# Patient Record
Sex: MALE | Race: OTHER | NOT HISPANIC OR LATINO | Employment: UNEMPLOYED | URBAN - METROPOLITAN AREA
[De-identification: names, ages, dates, MRNs, and addresses within clinical notes are randomized per-mention and may not be internally consistent; named-entity substitution may affect disease eponyms.]

---

## 2023-03-10 ENCOUNTER — TELEPHONE (OUTPATIENT)
Dept: PRIMARY CARE | Facility: CLINIC | Age: 63
End: 2023-03-10
Payer: MEDICAID

## 2023-04-27 PROBLEM — G89.29 CHRONIC LOW BACK PAIN: Status: ACTIVE | Noted: 2023-04-27

## 2023-04-27 PROBLEM — R93.7 ABNORMAL CT SCAN, LUMBAR SPINE: Status: ACTIVE | Noted: 2023-04-27

## 2023-04-27 PROBLEM — L71.9 ROSACEA: Status: ACTIVE | Noted: 2023-04-27

## 2023-04-27 PROBLEM — M54.50 CHRONIC LOW BACK PAIN: Status: ACTIVE | Noted: 2023-04-27

## 2023-04-27 PROBLEM — E78.2 MIXED HYPERLIPIDEMIA: Status: ACTIVE | Noted: 2023-04-27

## 2023-04-27 PROBLEM — N40.0 HYPERTROPHY OF PROSTATE: Status: ACTIVE | Noted: 2023-04-27

## 2023-04-27 PROBLEM — R06.02 EXERTIONAL SHORTNESS OF BREATH: Status: ACTIVE | Noted: 2023-04-27

## 2023-04-27 PROBLEM — K21.9 GASTROESOPHAGEAL REFLUX DISEASE: Status: ACTIVE | Noted: 2023-04-27

## 2023-04-27 PROBLEM — R13.10 DYSPHAGIA: Status: ACTIVE | Noted: 2023-04-27

## 2023-04-27 PROBLEM — E55.9 VITAMIN D DEFICIENCY: Status: ACTIVE | Noted: 2023-04-27

## 2023-04-27 PROBLEM — R91.1 NODULE OF APEX OF RIGHT LUNG: Status: ACTIVE | Noted: 2023-04-27

## 2023-04-27 PROBLEM — M51.9 LUMBAR DISC DISEASE: Status: ACTIVE | Noted: 2023-04-27

## 2023-04-27 PROBLEM — R73.03 PREDIABETES: Status: ACTIVE | Noted: 2023-04-27

## 2023-04-27 PROBLEM — R05.3 PERSISTENT COUGH: Status: ACTIVE | Noted: 2023-04-27

## 2023-04-27 RX ORDER — KETOROLAC TROMETHAMINE 5 MG/ML
SOLUTION OPHTHALMIC 4 TIMES DAILY
COMMUNITY
Start: 2022-10-26

## 2023-04-27 RX ORDER — OFLOXACIN 3 MG/ML
1 SOLUTION/ DROPS OPHTHALMIC EVERY 4 HOURS
COMMUNITY
Start: 2022-10-26

## 2023-04-27 RX ORDER — PREDNISOLONE ACETATE 10 MG/ML
1 SUSPENSION/ DROPS OPHTHALMIC EVERY 4 HOURS
COMMUNITY
Start: 2022-10-26

## 2023-04-27 RX ORDER — SUCRALFATE 1 G/1
1 TABLET ORAL
COMMUNITY
Start: 2023-01-31 | End: 2023-05-09 | Stop reason: ALTCHOICE

## 2023-04-27 RX ORDER — METRONIDAZOLE 7.5 MG/G
CREAM TOPICAL 2 TIMES DAILY
COMMUNITY
Start: 2022-10-19

## 2023-04-27 RX ORDER — OMEPRAZOLE 20 MG/1
20 CAPSULE, DELAYED RELEASE ORAL
COMMUNITY

## 2023-05-09 ENCOUNTER — OFFICE VISIT (OUTPATIENT)
Dept: PRIMARY CARE | Facility: CLINIC | Age: 63
End: 2023-05-09
Payer: MEDICAID

## 2023-05-09 ENCOUNTER — LAB (OUTPATIENT)
Dept: LAB | Facility: LAB | Age: 63
End: 2023-05-09
Payer: MEDICAID

## 2023-05-09 VITALS
BODY MASS INDEX: 28.49 KG/M2 | TEMPERATURE: 97.3 F | HEIGHT: 65 IN | RESPIRATION RATE: 16 BRPM | HEART RATE: 66 BPM | SYSTOLIC BLOOD PRESSURE: 138 MMHG | WEIGHT: 171 LBS | OXYGEN SATURATION: 100 % | DIASTOLIC BLOOD PRESSURE: 83 MMHG

## 2023-05-09 DIAGNOSIS — Z00.00 ROUTINE GENERAL MEDICAL EXAMINATION AT A HEALTH CARE FACILITY: Primary | ICD-10-CM

## 2023-05-09 DIAGNOSIS — Z12.5 SCREENING FOR PROSTATE CANCER: ICD-10-CM

## 2023-05-09 DIAGNOSIS — Z12.11 SCREENING FOR COLON CANCER: ICD-10-CM

## 2023-05-09 DIAGNOSIS — E78.2 MIXED HYPERLIPIDEMIA: ICD-10-CM

## 2023-05-09 DIAGNOSIS — Z00.00 ROUTINE GENERAL MEDICAL EXAMINATION AT A HEALTH CARE FACILITY: ICD-10-CM

## 2023-05-09 LAB
ALANINE AMINOTRANSFERASE (SGPT) (U/L) IN SER/PLAS: 22 U/L (ref 10–52)
ALBUMIN (G/DL) IN SER/PLAS: 4.1 G/DL (ref 3.4–5)
ALKALINE PHOSPHATASE (U/L) IN SER/PLAS: 114 U/L (ref 33–136)
ANION GAP IN SER/PLAS: 11 MMOL/L (ref 10–20)
ASPARTATE AMINOTRANSFERASE (SGOT) (U/L) IN SER/PLAS: 20 U/L (ref 9–39)
BILIRUBIN TOTAL (MG/DL) IN SER/PLAS: 0.3 MG/DL (ref 0–1.2)
CALCIUM (MG/DL) IN SER/PLAS: 9.1 MG/DL (ref 8.6–10.3)
CARBON DIOXIDE, TOTAL (MMOL/L) IN SER/PLAS: 28 MMOL/L (ref 21–32)
CHLORIDE (MMOL/L) IN SER/PLAS: 102 MMOL/L (ref 98–107)
CHOLESTEROL (MG/DL) IN SER/PLAS: 215 MG/DL (ref 0–199)
CHOLESTEROL IN HDL (MG/DL) IN SER/PLAS: 59.6 MG/DL
CHOLESTEROL/HDL RATIO: 3.6
CREATININE (MG/DL) IN SER/PLAS: 0.79 MG/DL (ref 0.5–1.3)
ERYTHROCYTE DISTRIBUTION WIDTH (RATIO) BY AUTOMATED COUNT: 13.6 % (ref 11.5–14.5)
ERYTHROCYTE MEAN CORPUSCULAR HEMOGLOBIN CONCENTRATION (G/DL) BY AUTOMATED: 32.5 G/DL (ref 32–36)
ERYTHROCYTE MEAN CORPUSCULAR VOLUME (FL) BY AUTOMATED COUNT: 91 FL (ref 80–100)
ERYTHROCYTES (10*6/UL) IN BLOOD BY AUTOMATED COUNT: 4.61 X10E12/L (ref 4.5–5.9)
GFR MALE: >90 ML/MIN/1.73M2
GLUCOSE (MG/DL) IN SER/PLAS: 100 MG/DL (ref 74–99)
HEMATOCRIT (%) IN BLOOD BY AUTOMATED COUNT: 41.8 % (ref 41–52)
HEMOGLOBIN (G/DL) IN BLOOD: 13.6 G/DL (ref 13.5–17.5)
LDL: 144 MG/DL (ref 0–99)
LEUKOCYTES (10*3/UL) IN BLOOD BY AUTOMATED COUNT: 7.1 X10E9/L (ref 4.4–11.3)
PLATELETS (10*3/UL) IN BLOOD AUTOMATED COUNT: 289 X10E9/L (ref 150–450)
POTASSIUM (MMOL/L) IN SER/PLAS: 4.2 MMOL/L (ref 3.5–5.3)
PROSTATE SPECIFIC ANTIGEN,SCREEN: 3.84 NG/ML (ref 0–4)
PROTEIN TOTAL: 7.1 G/DL (ref 6.4–8.2)
SODIUM (MMOL/L) IN SER/PLAS: 137 MMOL/L (ref 136–145)
TRIGLYCERIDE (MG/DL) IN SER/PLAS: 58 MG/DL (ref 0–149)
UREA NITROGEN (MG/DL) IN SER/PLAS: 18 MG/DL (ref 6–23)
VLDL: 12 MG/DL (ref 0–40)

## 2023-05-09 PROCEDURE — 85027 COMPLETE CBC AUTOMATED: CPT

## 2023-05-09 PROCEDURE — 80061 LIPID PANEL: CPT

## 2023-05-09 PROCEDURE — 84153 ASSAY OF PSA TOTAL: CPT

## 2023-05-09 PROCEDURE — 80053 COMPREHEN METABOLIC PANEL: CPT

## 2023-05-09 PROCEDURE — 1036F TOBACCO NON-USER: CPT | Performed by: STUDENT IN AN ORGANIZED HEALTH CARE EDUCATION/TRAINING PROGRAM

## 2023-05-09 PROCEDURE — 99396 PREV VISIT EST AGE 40-64: CPT | Performed by: STUDENT IN AN ORGANIZED HEALTH CARE EDUCATION/TRAINING PROGRAM

## 2023-05-09 PROCEDURE — 36415 COLL VENOUS BLD VENIPUNCTURE: CPT

## 2023-05-09 ASSESSMENT — ENCOUNTER SYMPTOMS
CONSTIPATION: 0
SHORTNESS OF BREATH: 0
DIZZINESS: 0
VOMITING: 0
UNEXPECTED WEIGHT CHANGE: 0
WHEEZING: 0
COLOR CHANGE: 0
HEADACHES: 0
ABDOMINAL PAIN: 0
DIARRHEA: 0
FEVER: 0
CHILLS: 0
COUGH: 0
FATIGUE: 0
MUSCULOSKELETAL NEGATIVE: 1
PALPITATIONS: 0
NAUSEA: 0
CONFUSION: 0

## 2023-05-09 ASSESSMENT — PATIENT HEALTH QUESTIONNAIRE - PHQ9
2. FEELING DOWN, DEPRESSED OR HOPELESS: NOT AT ALL
1. LITTLE INTEREST OR PLEASURE IN DOING THINGS: NOT AT ALL
SUM OF ALL RESPONSES TO PHQ9 QUESTIONS 1 & 2: 0

## 2023-05-09 ASSESSMENT — LIFESTYLE VARIABLES
HOW OFTEN DO YOU HAVE A DRINK CONTAINING ALCOHOL: NEVER
AUDIT-C TOTAL SCORE: 0
HOW OFTEN DO YOU HAVE SIX OR MORE DRINKS ON ONE OCCASION: NEVER
HOW MANY STANDARD DRINKS CONTAINING ALCOHOL DO YOU HAVE ON A TYPICAL DAY: PATIENT DOES NOT DRINK
SKIP TO QUESTIONS 9-10: 1

## 2023-05-09 NOTE — PROGRESS NOTES
"Subjective   Patient ID: Rosa Thomas is a 62 y.o. male who presents for Annual Exam.  Reports he is doing better, no more dyspnea/cough; has R eye infection, following with ophtho monthly.     Self health assessment: good   Concern: none   Occupation: works at store   Living With: family     Sleep: well   Exercise: walk around   Diet: 1-2 meals a day, follow mixed diet   Tobacco: No  Alcohol: Alcohol Use: No, patient does not drink alcohol.  Sexually active: Yes; no problem      Dentist: last mo   Eye doctor: seeing currently   Hearing issues: No    Family history of colon cancer: no  Last colonoscopy & result: Never had; okay to do now   History of abnormal lipids: yes - mild HLD per pt    Review of Systems   Constitutional:  Negative for chills, fatigue, fever and unexpected weight change.   HENT: Negative.     Respiratory:  Negative for cough, shortness of breath and wheezing.    Cardiovascular:  Negative for chest pain, palpitations and leg swelling.   Gastrointestinal:  Negative for abdominal pain, constipation, diarrhea, nausea and vomiting.   Musculoskeletal: Negative.    Skin:  Negative for color change and rash.   Neurological:  Negative for dizziness and headaches.   Psychiatric/Behavioral:  Negative for behavioral problems and confusion.         Objective    /83 (BP Location: Right arm, Patient Position: Sitting, BP Cuff Size: Adult)   Pulse 66   Temp 36.3 °C (97.3 °F)   Resp 16   Ht 1.651 m (5' 5\")   Wt 77.6 kg (171 lb)   SpO2 100%   BMI 28.46 kg/m²  Body mass index is 28.46 kg/m².    Physical Exam  Vitals and nursing note reviewed.   Constitutional:       Appearance: Normal appearance.   HENT:      Right Ear: Tympanic membrane normal.      Left Ear: Tympanic membrane normal.   Eyes:      Extraocular Movements: Extraocular movements intact.      Pupils: Pupils are equal, round, and reactive to light.   Cardiovascular:      Rate and Rhythm: Normal rate and regular rhythm.      Pulses: " Normal pulses.      Heart sounds: Normal heart sounds.   Pulmonary:      Effort: Pulmonary effort is normal. No respiratory distress.      Breath sounds: Normal breath sounds.   Abdominal:      General: Abdomen is flat. Bowel sounds are normal.      Palpations: Abdomen is soft.   Musculoskeletal:         General: Normal range of motion.   Neurological:      General: No focal deficit present.      Mental Status: He is alert and oriented to person, place, and time.   Psychiatric:         Mood and Affect: Mood normal.         Behavior: Behavior normal.        Assessment and Plan   He is here for annual physical. Overall doing okay, no major concerns today and is clinically & vitally stable. Plan as follows      #HM   Screening tests:  - Colonoscopy (age 45-75): ordered   - Lipid profile: ordered     Primary prevention:  - Flu shot: UTD   - COVID vaccines: UTD   - Tdap shot: rec to get at health dept   - Shingles shot (age >50): rec to get at health dept     Counseling:   - Diet, Weight gain: Advise heart healthy diet (low carbs, low fat; add more fruits/veges and whole grain food) and regular exercise 30mins daily x 5 days per week.  Also rec 10mins of aerobic exercise/jogging daily x 5 days/wk  - Rec Ca (600-1200mg) and Vit D (800-1000U) daily     Others:  - Depression screening: Neg, happy appearing male  - Bld work per EMR, will call for any abn result, pt was made aware   - cont taking all other meds as rx'd     Fouad was seen today for annual exam.  Diagnoses and all orders for this visit:  Routine general medical examination at a health care facility (Primary)  -     CBC; Future  -     Comprehensive Metabolic Panel; Future  Screening for colon cancer  -     Colonoscopy; Future  Mixed hyperlipidemia  -     Lipid Panel; Future  Screening for prostate cancer  -     Prostate Spec.Ag,Screen; Future     RTC 1 yr physical.   Sridhar Rosales MD   Family Medicine

## 2023-05-11 ENCOUNTER — TELEPHONE (OUTPATIENT)
Dept: PRIMARY CARE | Facility: CLINIC | Age: 63
End: 2023-05-11
Payer: MEDICAID

## 2023-05-11 DIAGNOSIS — E78.2 MIXED HYPERLIPIDEMIA: Primary | ICD-10-CM

## 2023-05-11 RX ORDER — ATORVASTATIN CALCIUM 20 MG/1
20 TABLET, FILM COATED ORAL DAILY
Qty: 30 TABLET | Refills: 5 | Status: SHIPPED | OUTPATIENT
Start: 2023-05-11 | End: 2023-11-07

## 2023-10-16 ENCOUNTER — TELEPHONE (OUTPATIENT)
Dept: PRIMARY CARE | Facility: CLINIC | Age: 63
End: 2023-10-16
Payer: MEDICAID

## 2023-10-16 NOTE — TELEPHONE ENCOUNTER
PATIENT NEEDS REFILL SENT IN ON THESE MEDS, DOESN'T LOOK LIKE THEY WERE SENT IN FROM THE LAST VISIT    OMEPRAZOLE  METRONIDOZOLE    PH[facility]Y: [facility]

## 2023-10-24 NOTE — TELEPHONE ENCOUNTER
PATIENT CALLED BACK STATING THAT THESE WERE PRESCRIBED WHEN HE WAS IN THE HOSPITAL, HE JUST NEEDS THESE REFILLED.    WE SCHEDULED A FOLLOW UP FOR JANUARY TO FOLLOW UP    [facility]: [facility]

## 2023-10-24 NOTE — TELEPHONE ENCOUNTER
LMOM FOR PATIENT TO CALL OFFICE IF HE HAS ANY FURTHER QUESTIONS ABOUT THE MEDS, OR IF HE WOULD LIKE TO MAKE AN APPT TO FOLLOW UP WITH DR. HUNTER SOONER THEN THE APPT THAT IS ALREADY SCHEDULED

## 2024-01-16 ENCOUNTER — APPOINTMENT (OUTPATIENT)
Dept: PRIMARY CARE | Facility: CLINIC | Age: 64
End: 2024-01-16
Payer: MEDICAID

## 2024-05-09 ENCOUNTER — APPOINTMENT (OUTPATIENT)
Dept: PRIMARY CARE | Facility: CLINIC | Age: 64
End: 2024-05-09
Payer: MEDICAID

## 2024-09-30 DIAGNOSIS — R94.39 ABNORMAL STRESS TEST: Primary | ICD-10-CM

## 2024-09-30 DIAGNOSIS — R94.31 ABNORMAL EKG: ICD-10-CM

## 2024-10-03 ENCOUNTER — TELEPHONE (OUTPATIENT)
Dept: CARDIOLOGY | Facility: HOSPITAL | Age: 64
End: 2024-10-03
Payer: MEDICAID

## 2024-10-03 PROBLEM — R94.39 ABNORMAL STRESS TEST: Status: ACTIVE | Noted: 2024-09-30

## 2024-10-03 PROBLEM — R94.31 ABNORMAL EKG: Status: ACTIVE | Noted: 2024-09-30

## 2024-10-03 NOTE — TELEPHONE ENCOUNTER
RN called pt to move heart cath up at 0730 arrival and 0830 procedure. Pt verbalized understanding.        Called cath instructions to patient including review of date and arrival time.  Verified no need for dye prep.  Labs reviewed.  Nothing to eat or drink after midnight except ASA with a sip of water the morning of the cath.  You will need a .  Bring your ID, insurance cards and either a perfect list of the medications you are swallowing or the medications in original bottles.  Wear comfortable clothing.  There is a possibility of staying over night for observation so pack a bag with necessities for that.  Patient correctly repeated instructions, verbalized understanding and is agreeable to the plan.         ----- Message from Ese FERREIRA sent at 10/3/2024 11:26 AM EDT -----  Regarding: AG cath scheduled for 10/14/24  Can you please call him with instructions for his cath scheduled with AG for 10/14/24  Arrive 930a  Procedure 1030a    Asking Cynthia to update his H&P and do a ECG before procedure.   Looks like he had recent labs done, let me know if we need to do more?

## 2024-10-14 ENCOUNTER — HOSPITAL ENCOUNTER (OUTPATIENT)
Facility: HOSPITAL | Age: 64
Setting detail: OUTPATIENT SURGERY
Discharge: HOME | End: 2024-10-14
Attending: INTERNAL MEDICINE | Admitting: INTERNAL MEDICINE
Payer: MEDICAID

## 2024-10-14 VITALS
DIASTOLIC BLOOD PRESSURE: 83 MMHG | BODY MASS INDEX: 27.32 KG/M2 | SYSTOLIC BLOOD PRESSURE: 148 MMHG | RESPIRATION RATE: 16 BRPM | OXYGEN SATURATION: 99 % | WEIGHT: 170 LBS | HEIGHT: 66 IN | HEART RATE: 77 BPM | TEMPERATURE: 97.9 F

## 2024-10-14 DIAGNOSIS — R94.31 ABNORMAL EKG: ICD-10-CM

## 2024-10-14 DIAGNOSIS — R07.89 OTHER CHEST PAIN: ICD-10-CM

## 2024-10-14 DIAGNOSIS — R94.39 ABNORMAL STRESS TEST: Primary | ICD-10-CM

## 2024-10-14 PROCEDURE — 7100000009 HC PHASE TWO TIME - INITIAL BASE CHARGE: Performed by: INTERNAL MEDICINE

## 2024-10-14 PROCEDURE — 93458 L HRT ARTERY/VENTRICLE ANGIO: CPT | Performed by: INTERNAL MEDICINE

## 2024-10-14 PROCEDURE — 7100000010 HC PHASE TWO TIME - EACH INCREMENTAL 1 MINUTE: Performed by: INTERNAL MEDICINE

## 2024-10-14 PROCEDURE — C1887 CATHETER, GUIDING: HCPCS | Performed by: INTERNAL MEDICINE

## 2024-10-14 PROCEDURE — 2550000001 HC RX 255 CONTRASTS: Performed by: INTERNAL MEDICINE

## 2024-10-14 PROCEDURE — C1894 INTRO/SHEATH, NON-LASER: HCPCS | Performed by: INTERNAL MEDICINE

## 2024-10-14 PROCEDURE — 99152 MOD SED SAME PHYS/QHP 5/>YRS: CPT | Performed by: INTERNAL MEDICINE

## 2024-10-14 PROCEDURE — 2720000007 HC OR 272 NO HCPCS: Performed by: INTERNAL MEDICINE

## 2024-10-14 PROCEDURE — C1769 GUIDE WIRE: HCPCS | Performed by: INTERNAL MEDICINE

## 2024-10-14 PROCEDURE — C1760 CLOSURE DEV, VASC: HCPCS | Performed by: INTERNAL MEDICINE

## 2024-10-14 PROCEDURE — 96373 THER/PROPH/DIAG INJ IA: CPT | Mod: 59 | Performed by: INTERNAL MEDICINE

## 2024-10-14 PROCEDURE — 2500000004 HC RX 250 GENERAL PHARMACY W/ HCPCS (ALT 636 FOR OP/ED): Performed by: INTERNAL MEDICINE

## 2024-10-14 PROCEDURE — 2500000005 HC RX 250 GENERAL PHARMACY W/O HCPCS: Performed by: INTERNAL MEDICINE

## 2024-10-14 RX ORDER — ACETAMINOPHEN 160 MG/5ML
650 SOLUTION ORAL EVERY 6 HOURS PRN
Status: CANCELLED | OUTPATIENT
Start: 2024-10-14

## 2024-10-14 RX ORDER — ACETAMINOPHEN 325 MG/1
650 TABLET ORAL EVERY 6 HOURS PRN
Status: CANCELLED | OUTPATIENT
Start: 2024-10-14

## 2024-10-14 RX ORDER — WATER
240 LIQUID (ML) MISCELLANEOUS
Status: CANCELLED | OUTPATIENT
Start: 2024-10-14 | End: 2024-10-14

## 2024-10-14 RX ORDER — MORPHINE SULFATE 2 MG/ML
2 INJECTION, SOLUTION INTRAMUSCULAR; INTRAVENOUS EVERY 6 HOURS PRN
Status: CANCELLED | OUTPATIENT
Start: 2024-10-14

## 2024-10-14 RX ORDER — ASPIRIN 81 MG/1
81 TABLET ORAL DAILY
COMMUNITY

## 2024-10-14 RX ORDER — HEPARIN SODIUM 1000 [USP'U]/ML
INJECTION, SOLUTION INTRAVENOUS; SUBCUTANEOUS AS NEEDED
Status: DISCONTINUED | OUTPATIENT
Start: 2024-10-14 | End: 2024-10-14 | Stop reason: HOSPADM

## 2024-10-14 RX ORDER — ACETAMINOPHEN 650 MG/1
650 SUPPOSITORY RECTAL EVERY 6 HOURS PRN
Status: CANCELLED | OUTPATIENT
Start: 2024-10-14

## 2024-10-14 RX ORDER — FENTANYL CITRATE 50 UG/ML
INJECTION, SOLUTION INTRAMUSCULAR; INTRAVENOUS AS NEEDED
Status: DISCONTINUED | OUTPATIENT
Start: 2024-10-14 | End: 2024-10-14 | Stop reason: HOSPADM

## 2024-10-14 RX ORDER — MIDAZOLAM HYDROCHLORIDE 1 MG/ML
INJECTION, SOLUTION INTRAMUSCULAR; INTRAVENOUS AS NEEDED
Status: DISCONTINUED | OUTPATIENT
Start: 2024-10-14 | End: 2024-10-14 | Stop reason: HOSPADM

## 2024-10-14 RX ORDER — LIDOCAINE HYDROCHLORIDE 20 MG/ML
INJECTION, SOLUTION INFILTRATION; PERINEURAL AS NEEDED
Status: DISCONTINUED | OUTPATIENT
Start: 2024-10-14 | End: 2024-10-14 | Stop reason: HOSPADM

## 2024-10-14 ASSESSMENT — ENCOUNTER SYMPTOMS
MUSCULOSKELETAL NEGATIVE: 1
CONSTITUTIONAL NEGATIVE: 1
ENDOCRINE NEGATIVE: 1
PSYCHIATRIC NEGATIVE: 1
NEUROLOGICAL NEGATIVE: 1
RESPIRATORY NEGATIVE: 1
CARDIOVASCULAR NEGATIVE: 1

## 2024-10-14 ASSESSMENT — PAIN SCALES - GENERAL

## 2024-10-14 ASSESSMENT — PAIN - FUNCTIONAL ASSESSMENT
PAIN_FUNCTIONAL_ASSESSMENT: 0-10

## 2024-10-14 NOTE — DISCHARGE INSTRUCTIONS
If you have any questions, please call the Cath Lab Nurse Practitioner Cynthiacorby Holbrook at [phone] and leave a message. She will return your call the same day if calling before 3 PM, M-F. If you call on the weekend you can expect a call back on Monday morning. You may also call the Cath Lab at [phone] M-F, 7-3:30 with any questions. Weekends and after hours please call your cardiologist office number to reach a physician on call. The heart group office number is [phone]           CARDIAC CATHETERIZATION DISCHARGE INSTRUCTIONS     FOR SUDDEN AND SEVERE CHEST PAIN, SHORTNESS OF BREATH, EXCESSIVE BLEEDING, SIGNS OF STROKE, OR CHANGES IN MENTAL STATUS YOU SHOULD CALL 911 IMMEDIATELY.     If your provider has prescribed aspirin and/or clopidogrel (Plavix), or prasugrel (Effient), or ticagrelor (Brilinta), DO NOT STOP THESE MEDICATIONS for any reason without talking to your cardiologist first. If any of these were prescribed, you must take them every day without missing a single dose. If you are getting low on these medications, contact your provider immediately for a refill.     FOR NEXT 24 HOURS  - Upon discharge, you should return home and rest for the remainder of the day and evening. You do not have to stay on bed rest but should not be very active.  It is recommended a responsible adult be with you for the first 24 hours after the procedure.    - No driving for 24 hours after procedure. Please arrange for someone to drive you home from the hospital today.     - Do not drive, operate machinery, or use power tools for 24 hours after your procedure.     - Do not make any legal decisions for 24 hours after your procedure.     - Do not drink alcoholic beverages for 24 hours after your procedure.    WOUND CARE   *FOR FEMORAL (LEG) ACCESS*  ·      Avoid heavy lifting (over 10 pounds) for 7 days, squatting or excessive bending for 2 days, and strenuous exercise for 7 days.  ·      No submerged bathing, swimming, or  hot tubs for the next 7 days, or until fully healed.  ·      Avoid sexual activity for 3-4 days until any groin discomfort has ceased.     *FOR RADIAL (WRIST) ACCESS*  ·      No lifting more than 5 pounds or excessive use of the wrist for 24 hours - for example, treat your wrist as if it is sprained.  ·      Do not engage in vigorous activities (tennis, golf, bowling, weights) for at least 48 hours after the procedure.  ·      Do not submerge the wrist for 7 days after the procedure.  ·      You should expect mild tingling in your hand and tenderness at the puncture site for up to 3 days.    - The transparent dressing should be removed from the site 24 hours after the procedure.  Wash the site gently with soap and water. Rinse well and pat dry. Keep the area clean and dry. You may apply a Band-Aid to the site. Avoid lotions, ointments, or powders until fully healed.     - You may shower the day after your procedure.      - It is normal to notice a small bruise around the puncture site and/or a small grape sized or smaller lump. Any large bruising or large lump warrants a call to the office.     - If bleeding should occur, lay down and apply pressure to the affected area for 10 minutes.  If the bleeding stops notify your physician.  If there is a large amount of bleeding or spurting of blood CALL 911 immediately.  DO NOT drive yourself to the hospital.    - You may experience some tenderness, bruising or minimal inflammation.  If you have any concerns, you may contact the Cath Lab or if any of these symptoms become excessive, contact your cardiologist or go to the emergency room.     OTHER INSTRUCTIONS  - You may take acetaminophen (Tylenol) as directed for discomfort.  If pain is not relieved with acetaminophen (Tylenol), contact your doctor.    - If you notice or experience any of the following, you should notify your doctor or seek medical attention  Chest pain or discomfort  Change in mental status or weakness in  extremities.  Dizziness, light headedness, or feeling faint.  Change in the site where the procedure was performed, such as bleeding or an increased area of bruising or swelling.  Tingling, numbness, pain, or coolness in the leg/arm beyond the site where the procedure was performed.  Signs of infection (i.e. shaking chills, temperature > 100 degrees Fahrenheit, warmth, redness) in the leg/arm area where the procedure was performed.  Changes in urination   Bloody or black stools  Vomiting blood  Severe nose bleeds  Any excessive bleeding    - If you DO NOT have an appointment with your cardiologist within 2-4 weeks following your procedure, please contact their office.      Important ways to reduce your risk of coronary artery disease by healthy diet, maintaining a healthy weight, exercising regularly and stop using tobacco products.     Mediterranean Diet    About this topic  This is a heart healthy diet. It is based on widely used foods and cooking styles from many countries around the [place]. The main pattern for the diet is more plant foods and monounsaturated fats, or good fats, like olive oil. Protein in this diet comes from seafood, legumes, nuts, seeds, and poultry and eggs in lowered amounts. You will also eat more whole grains, vegetables, and fruits and moderate amounts of alcohol are also included. This diet has less red meats, dairy products, and processed foods.    What will the results be?  Your diet will have less saturated fat, cholesterol, calories, sodium, and added sugars. Your diet will be higher in fiber. This will help to keep your blood sugar steady. This diet lowers the chance of heart disease and other health problems.  What lifestyle changes are needed?  If you do not often eat this way, you will need to change your eating habits. Be sure to get regular exercise. It is believed to help the health benefits of this diet.  What changes to diet are needed?  You may need to limit the  "amount of red meat and processed foods in your diet. Ask your dietitian for help planning meals that are right for you.  What foods are good to eat?  Plenty of fish and other seafood  Fresh, frozen, or canned fruits and vegetables  Nuts and nut butters and dried beans, lentils, or peas  Foods high in fiber like whole grains and whole grain products  Olive oil (good fat), peanut or canola oil, margarine, or spreads that list vegetable oil as the first ingredient and do not contain trans fat or partially hydrogenated oil  Small amounts of poultry and eggs  What foods should be limited or avoided?  Red meats  Sweets, desserts, and processed foods  Butter, oils, and fats that contain trans fats or are hydrogenated or partially hydrogenated  Gravies and sauces  What problems could happen?  Your weight may rise because your diet will be higher in fat from olive oil and nuts.  You may have lower iron levels. Be sure to eat foods rich in iron. Also, eat foods rich in vitamin C. This will help your body take in iron.  You may have lower calcium levels because you are eating less dairy products. Ask your doctor if you need to take a calcium supplement.  Wine is often thought of as part of a Mediterranean diet. It is not needed and you may choose not to include it. Avoid wine if you are prone to alcohol abuse or are pregnant. Also, avoid it if you are at risk for breast cancer, have liver problems, or have other illnesses that make it important for you to not have alcohol.  When do I need to call the doctor?  If you have any concerns about your diet     Health risks of obesity    The Basics  Written by the doctors and editors at [facility]  What does it mean to have obesity? -- Doctors use a calculation called \"body mass index,\" or \"BMI,\" to decide whether a person is underweight, at a healthy weight, overweight, or has obesity.  Your BMI will tell you which category you are in based on your weight and height (figure 1):  ?If " "your BMI is between 25 and 29.9, you are overweight.  ?If your BMI is 30 or greater, you have obesity.  Obesity is a problem, because it increases the risks of many different health problems. It can also make it hard for you to move, breathe, and do other things that people who are at a healthy weight can do easily.  What are the health risks of obesity? -- Having obesity increases a person's risk of developing many health problems. Here are just a few examples:  ?Diabetes  ?High blood pressure  ?High cholesterol  ?Heart disease (including heart attacks)  ?Stroke  ?Sleep apnea (a disorder in which you stop breathing for short periods while asleep)  ?Asthma  ?Cancer  Does having obesity shorten a person's life? -- Yes. Studies show that people with obesity die younger than people who are a healthy weight. They also show that the risk of death goes up the heavier a person is. The degree of increased risk depends on how long the person has had obesity, and on what other medical problems they have.  People with \"central obesity\" might also be at risk of dying younger. Central obesity means carrying extra weight in the belly area, even if the BMI is normal.  Should I see an expert? -- Yes. If you are overweight or have obesity, you can talk to your doctor or nurse. They might have suggestions for healthy ways to lose weight. It can also help to work with a dietitian (food and nutrition expert). A dietitian can help you choose healthy foods and plan meals.  Are there medical treatments that can help me lose weight? -- Yes. There are medicines and surgery to help with weight loss. But those treatments are only for people who have not been able to lose weight through lifestyle changes such as diet and exercise. Also, weight loss treatments do not take the place of diet and exercise. People who have those treatments must also change how they eat and how active they are.  What can I do to prevent the problems caused by " obesity? -- The best thing that you can do is lose weight. But even if weight loss is not possible, you can improve your health and lower your risk if you:  ?Become more active - Many types of physical activity can help, including walking. You can start with a few minutes a day and add more as you get stronger and build up your endurance. Anything that gets your body moving is good for you.  ?Improve your diet - It is healthy to have regular meal times, eat smaller portions, and not skip meals. Limit sweets, and avoid processed foods. Try to eat more vegetables and fruits instead.  ?Quit smoking (if you smoke) - Some people start eating more after they stop smoking, so try to make healthy food choices. Even if it increases your appetite, quitting smoking is still one of the best things that you can do to improve your health.  ?Limit alcohol - For females, drink no more than 1 drink a day. For males, drink no more than 2 drinks a day.  What causes obesity? -- The thing that increases a person's risk the most is having an unhealthy lifestyle. Most people develop obesity because they eat too much, eat unhealthy foods, and move too little. That's especially true of people who watch too much TV. But there are also other things that seem to increase the risk of obesity that many people do not know about. Here are some things that might affect a person's chance of developing obesity:  ?Mother's habits during and after pregnancy - People who eat a lot of calories, have diabetes, or smoke during pregnancy have a higher chance of having babies who have obesity as adults. Also, babies who drink formula might be more likely than  babies to develop obesity later in life.  ?Habits and weight gain during childhood - Children or teens who are overweight or have obesity are more likely to become have obesity as an adult.  ?Sleeping too little - People who do not get enough sleep are more likely to develop obesity than  "people who sleep enough.  ?Taking certain medicines - Long-term use of certain medicines, such as some medicines to treat depression, can cause weight gain. If you are concerned that 1 of your medicines might be making you gain weight, talk to your doctor or nurse. They might be able to switch you to a different medicine instead.  ?Certain hormonal conditions - Some hormonal problems can increase the risk of developing obesity. For example, a condition called \"polycystic ovary syndrome\" can cause weight gain, along with other symptoms like irregular periods.  What if I want to get pregnant? -- If you are overweight or have obesity, it might be harder to get pregnant. For males, obesity can also cause sex problems, like having trouble getting or keeping an erection. This is more likely if you also have high blood pressure or diabetes.  What if my child has obesity? -- In children, obesity has many of the same risks as it does in adults. For example, it can increase the risk of diabetes, high blood pressure, asthma, and sleep apnea. It can also cause added problems related to childhood. For example, obesity can make children grow faster than normal and cause girls to go through puberty earlier than usual.  All topics are updated as new evidence becomes available and our peer review process is complete.  This topic retrieved from [employer] on: Jan 11, 2024.  Topic 77104 Version 17.0  Release: 31.6.4 - C32.10  © 2024 [employer] and/or its affiliates. All rights reserved.  figure 1: Your body mass index (BMI)        If you use tobacco products please review   Quitting smoking    The Basics  Written by the doctors and editors at [employer]  What are the benefits of quitting smoking? -- Quitting smoking can dramatically improve your health and help you live longer. It lowers your risk of heart disease, lung disease, kidney failure, cancer, infection, stomach problems, and diabetes.  Quitting smoking can also lower your " "chances of getting osteoporosis, a condition that makes your bones weak.  Quitting is not easy for most people, and it might take several tries to completely quit. But help and support are available. Quitting smoking will improve your health no matter how old you are, even if you have smoked for a long time.  What should I do if I want to quit smoking? -- It's a good idea to start by talking with your doctor or nurse. It is possible to quit on your own, without help. But getting help greatly increases your chances of quitting successfully.  When you are ready to quit, you will make a plan to:  ?Set a quit date.  ?Tell your family and friends that you plan to quit.  ?Plan ahead for the challenges you will face, such as cigarette cravings.  ?Remove cigarettes from your home, car, and work.  How can my doctor or nurse help? -- Your doctor or nurse can give you advice on the best way to quit. They can also give you medicines to:  ?Reduce your craving for cigarettes  ?Reduce your \"withdrawal\" symptoms (symptoms that happen when you stop smoking)  Your doctor or nurse can also help you find a counselor to talk to. For most people who are trying to quit smoking, it works best to use both medicines and counseling.  You can also get help from a free phone line (8-218-QUITNOW, or 1-849.497.8467) or go online to www.smokefree.gov.  What are the symptoms of withdrawal? -- When you stop smoking, you might have symptoms such as:  ?Trouble sleeping  ?Feeling irritable, anxious, or restless  ?Getting frustrated or angry  ?Having trouble thinking clearly  These symptoms can be hard to deal with, which is why it can be so hard to quit. But medicines can help.  Some people who stop smoking become temporarily depressed. Some people need treatment for depression, such as counseling or medicines or both. People with depression might:  ?No longer enjoy or care about doing the things they used to like to do  ?Feel sad, down, hopeless, " nervous, or cranky most of the day, almost every day  ?Lose or gain weight  ?Sleep too much or too little  ?Feel tired or like they have no energy  ?Feel guilty or like they are worth nothing  ?Forget things or feel confused  ?Move and speak more slowly than usual  ?Act restless or have trouble staying still  ?Think about death or suicide  If you think you might be depressed, tell your doctor or nurse right away. They can talk to you about your symptoms and recommend treatment if needed.  Get help right away if you are thinking of hurting or killing yourself! -- Sometimes, people with depression think of hurting or killing themselves. If you ever feel like you might hurt yourself, help is available:  ?In the , contact the [facility] Suicide & Crisis Lifeline:  To speak to someone, call or text [facility].  To talk to someone online, go to www.[employer]/chat.  ?Call your doctor or nurse and tell them that it is an emergency.  ?Call for an ambulance (in the [place] and [place], call 9-1-1).  ?Go to the emergency department at your local hospital.  If you think your partner might have depression, or if you are worried that they might hurt themselves, get them help right away.  How does counseling work? -- A counselor can help you figure out:  ?What triggers you to want to smoke, and how to handle these situations  ?How to resist cravings  ?What you can do differently if you have tried to quit before  You can meet with a counselor in 1-on-1 sessions or as part of a group. There are other ways to get counseling, too, such as over the phone, through text messaging, or online.  How do medicines help you stop smoking? -- Different medicines work in different ways:  ?Nicotine replacement therapy - Nicotine is the main drug in cigarettes, and the reason they are addictive. These medicines reduce your body's craving for nicotine. They also help with withdrawal symptoms.  There are different forms of nicotine replacement, including skin  "patches, lozenges, gum, nasal sprays, and inhalers. Most can be bought without a prescription. Also, health insurance might cover some or all of the cost.  It often helps to use 2 forms of nicotine replacement. For example, you might wear a patch all the time, plus use gum or lozenges when you get a craving to smoke.  ?Varenicline - Varenicline (brand names: Chantix, Champix) is a prescription medicine that reduces withdrawal symptoms and cigarette cravings. Varenicline can increase the effects of alcohol in some people. It's a good idea to limit drinking while you're taking it, at least until you know how it affects you.  Even if you are not yet ready to commit to a quit date, varenicline can help reduce cravings. This can make it easier to quit when you are ready.  ?Bupropion - Bupropion (sample brand names: Wellbutrin, Zyban) is a prescription medicine that reduces your desire to smoke. It is also available in a generic version, which is cheaper than the brand name medicines. Doctors do not usually prescribe bupropion for people with seizures or who have had seizures in the past.  It might also be helpful to combine nicotine replacement with bupropion or varenicline. In some cases, a person might even take both bupropion and varenicline. Your doctor or nurse can help you figure out the best combination for you.  What about e-cigarettes? -- Sometimes people wonder if using electronic cigarettes, or \"e-cigarettes,\" can help them quit smoking. Using e-cigarettes is also called \"vaping.\" Doctors do not recommend e-cigarettes in place of using of medicines and counseling. That's because e-cigarettes still contain nicotine as well as other substances that might be harmful. It's also not clear how they can affect a person's health in the long term.  What if I am pregnant and I smoke? -- If you are pregnant, it's really important for the health of your baby that you quit. Ask your doctor what options you have, and what " is safest for your baby.  What if I have already smoked for a long time? -- The longer you have smoked, the higher your chances are of having health problems. But it is never too late to quit smoking. It helps your health even if you are older or have smoked for many years. The best thing you can do to lower your chance of having a health problem caused by smoking is to quit.  Will I gain weight if I quit? -- You might gain a few pounds. This can be frustrating for some people. But it's important to remember that you are improving your health by quitting smoking. You can help prevent gaining a lot of weight by staying active and eating a healthy diet.  What if I am not able to quit? -- If you don't quit on your first try, or if you quit but then start smoking again, don't give up hope. Lots of people have to try more than once before they are able to completely quit.  It might help to try to understand why quitting did not work. There might be something you can do differently when you try again. It can help to figure out which situations make you want to smoke, so you can avoid them.  What else can I do to improve my chances of quitting? -- You can:  ?Get regular exercise. Any type of physical activity, even gentle forms of movement, is good for your health. Physical activity can also help reduce stress.  ?Stay away from people who smoke and places that make you want to smoke. If people close to you smoke, ask them to quit with you or avoid smoking around you.  ?Carry gum, hard candy, or something to put in your mouth. If you get a craving for a cigarette, try 1 of these instead.  ?Don't give up, even if you start smoking again. It takes most people a few tries before they succeed.  All topics are updated as new evidence becomes available and our peer review process is complete.

## 2024-10-14 NOTE — H&P
"History Of Present Illness:    Rosa Thomas is a 63 y.o. male presenting with NSVT during stress test in July and atypical chest pain. He was referred for [facility] by Dr. Jaspreet Priest. He has a significant past medical history of GERD, degenerative disc disease, and BPH. He denies history of HTN, hyperlipidemia or diabetes. He is a former smoker- he quit about 18 years ago. He had a 25 pack year history of smoking.     He presented to [facility] in June of 2024 for chest discomfort and some left shoulder pain and numbness as well as cough. In ER he had a negative D-dimer, and troponin of 4 and BNP of 42. He underwent stress testing in July that was read as negative with minor EKG changes. He was noted to have PACs, occasional PVCS, SVT and nonsustained VT.    Patient denies any further chest discomfort from July, he does endorse ingestion symptoms/heart burn that is improving with anti-acids. Outside records from [facility] reviewed as well as pre lab work and stress test results from July. Patient had recent echo but I could not find record of echo in the system.     Review of Systems   Constitutional: Negative.    HENT: Negative.     Respiratory: Negative.     Cardiovascular: Negative.    Endocrine: Negative.    Genitourinary: Negative.    Musculoskeletal: Negative.    Neurological: Negative.    Psychiatric/Behavioral: Negative.       Last Recorded Vitals:  Vitals:    10/14/24 0728   BP: 148/83   Pulse: 77   Resp: 16   Temp: 36.6 °C (97.9 °F)   SpO2: 99%   Weight: 77.1 kg (170 lb)   Height: 1.676 m (5' 6\")       Last Labs:  CBC - 9/25/24 WBC 7.9/Hgb 13.7/Hematocrit 40.3/Platelets 300  _ _ _    _      CMP - 9/25/24 Na+ 139/K+ 4.1/Cl- 102/BUN 14/Creatinine 0.8/eGFR 99/gjucose 108   _ _ _ --- _   _ _ _ _      PTT - none   _   _ _     VLDL   Date/Time Value Ref Range Status   05/09/2023 09:35 AM 12 0 - 40 mg/dL Final      Last I/O:  No intake/output data recorded.    Past Cardiology Tests (Last 3 " "Years):  EKG:  No results found for this or any previous visit from the past 1095 days.    Echo:  No results found for this or any previous visit from the past 1095 days.    Ejection Fractions:  No results found for: \"EF\"  Cath:  No results found for this or any previous visit from the past 1095 days.    Stress Test:  No results found for this or any previous visit from the past 1095 days.    Cardiac Imaging:  No results found for this or any previous visit from the past 1095 days.      Past Medical History:  He has a past medical history of Personal history of other endocrine, nutritional and metabolic disease (10/14/2019).    Past Surgical History:  He has no past surgical history on file.      Social History:  He reports that he has never smoked. He has never been exposed to tobacco smoke. He has never used smokeless tobacco. He reports that he does not drink alcohol and does not use drugs.    Family History:  Family History   Problem Relation Name Age of Onset    No Known Problems Mother      No Known Problems Father          Allergies:  Patient has no known allergies.    Inpatient Medications:  Scheduled medications   Medication Dose Route Frequency     PRN medications   Medication    oxygen     Continuous Medications   Medication Dose Last Rate     Outpatient Medications:  Current Outpatient Medications   Medication Instructions    aspirin-calcium carbonate 81 mg-300 mg calcium(777 mg) tablet 1 tablet, oral, Daily    aspirin 81 mg, oral, Daily    atorvastatin (LIPITOR) 20 mg, oral, Daily    ketorolac (Acular) 0.5 % ophthalmic solution 4 times daily    metroNIDAZOLE (Metrocream) 0.75 % cream Topical, 2 times daily    ofloxacin (Ocuflox) 0.3 % ophthalmic solution 1 drop, Both Eyes, Every 4 hours    omeprazole (PRILOSEC) 20 mg, oral, Daily before breakfast    prednisoLONE acetate (Pred-Forte) 1 % ophthalmic suspension 1 drop, Both Eyes, Every 4 hours       Physical Exam  Vitals reviewed.   Constitutional:       " Appearance: Normal appearance.   HENT:      Head: Normocephalic.      Nose: Nose normal.      Mouth/Throat:      Mouth: Mucous membranes are moist.   Cardiovascular:      Rate and Rhythm: Normal rate and regular rhythm.      Pulses: Normal pulses.      Heart sounds: Normal heart sounds.      Comments: + right radial barbeau of B  Pulmonary:      Effort: Pulmonary effort is normal.      Breath sounds: Normal breath sounds.   Musculoskeletal:      Right lower leg: No edema.      Left lower leg: No edema.   Skin:     General: Skin is warm and dry.      Capillary Refill: Capillary refill takes less than 2 seconds.   Neurological:      General: No focal deficit present.      Mental Status: He is alert and oriented to person, place, and time.   Psychiatric:         Mood and Affect: Mood normal.         Behavior: Behavior normal.         Thought Content: Thought content normal.       Assessment/Plan   NSVT - LHC r/o obstructive CAD      Hyperlipidemia- consider starting statin     Code Status:  Full Code    I spent 20 minutes in the professional and overall care of this patient.        Keena Holbrook, APRN-CNP

## 2024-10-14 NOTE — POST-PROCEDURE NOTE
Physician Transition of Care Summary  Invasive Cardiovascular Lab    Procedure Date: 10/14/2024  Attending:    Mumtaz Clay - Primary  Resident/Fellow/Other Assistant: Surgeons and Role:  * No surgeons found with a matching role *    Indications:   Pre-op Diagnosis      * Abnormal stress test [R94.39]     * Abnormal EKG [R94.31]    Post-procedure diagnosis:   Post-op Diagnosis     * Abnormal stress test [R94.39]     * Abnormal EKG [R94.31]    Procedure(s):   Left Heart Cath  38917 - [place] CATH PLMT L HRT & ARTS W/NJX & ANGIO IMG S&I        Procedure Findings:   Mild CAD of the LAD, no other significant CAD   Please see final report for details     Description of the Procedure:   LHC   RRA>TR band     Complications:   None     Stents/Implants:   Implants       No implant documentation for this case.            Anticoagulation/Antiplatelet Plan:   Aspirin 81 mg daily -lifelong     Estimated Blood Loss:   5 mL    Anesthesia: Moderate Sedation Anesthesia Staff: No anesthesia staff entered.    Any Specimen(s) Removed:   No specimens collected during this procedure.    Disposition:   Recovery and home later on today    Recs:   Medical management for CAD  Aggressive CV risk reduction   Follow up with Dr. Priest at [facility]       Electronically signed by: DAWOOD Fritz-CNP, 10/14/2024 9:56 AM

## 2024-10-14 NOTE — PRE-SEDATION DOCUMENTATION
"Cardiology Preprocedure Note    Rosa Thomas   Indication for procedure: The primary encounter diagnosis was Abnormal stress test. A diagnosis of Abnormal EKG was also pertinent to this visit. Here for C to r/o obstructive CAD.         /83   Pulse 77   Temp 36.6 °C (97.9 °F)   Resp 16   Ht 1.676 m (5' 6\")   Wt 77.1 kg (170 lb)   SpO2 99%   BMI 27.44 kg/m²    Relevant Labs:   Lab Results   Component Value Date    CREATININE 0.79 05/09/2023       Planned Sedation/Anesthesia: Moderate    Airway assessment: normal    Directed physical examination: General: Alert and Oriented, No distress, cooperative. Lungs: Clear to auscultation bilaterally, no wheezes, rhonci, or rales. respirations unlabored Heart: regular rate and rhythm, S1 and S2, no murmur     Mallampati: II (hard and soft palate, upper portion of tonsils and uvula visible)    ASA Score: ASA 2 - Patient with mild systemic disease with no functional limitations    Benefits, risks and alternatives of procedure and planned sedation have been discussed with the patient and/or their representative. All questions answered and they agree to proceed.     Keena Holbrook, APRN-CNP   "

## 2025-06-25 ENCOUNTER — APPOINTMENT (OUTPATIENT)
Dept: RADIOLOGY | Facility: HOSPITAL | Age: 65
DRG: 004 | End: 2025-06-25
Payer: MEDICARE

## 2025-06-25 ENCOUNTER — CLINICAL SUPPORT (OUTPATIENT)
Dept: EMERGENCY MEDICINE | Facility: HOSPITAL | Age: 65
DRG: 004 | End: 2025-06-25
Payer: MEDICARE

## 2025-06-25 ENCOUNTER — APPOINTMENT (OUTPATIENT)
Dept: RADIOLOGY | Facility: HOSPITAL | Age: 65
End: 2025-06-25
Payer: MEDICARE
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
## 2025-06-25 LAB
ANION GAP BLDV CALCULATED.4IONS-SCNC: 14 MMOL/L (ref 10–25)
BASE EXCESS BLDV CALC-SCNC: -7.8 MMOL/L (ref -2–3)
BODY TEMPERATURE: 37 DEGREES CELSIUS
CA-I BLDV-SCNC: 1.04 MMOL/L (ref 1.1–1.33)
CHLORIDE BLDV-SCNC: 92 MMOL/L (ref 98–107)
GLUCOSE BLDV-MCNC: 135 MG/DL (ref 74–99)
HCO3 BLDV-SCNC: 22.4 MMOL/L (ref 22–26)
HCT VFR BLD EST: 37 % (ref 41–52)
HGB BLDV-MCNC: 12.2 G/DL (ref 13.5–17.5)
LACTATE BLDV-SCNC: 1.8 MMOL/L (ref 0.4–2)
OXYHGB MFR BLDV: 49.2 % (ref 45–75)
PCO2 BLDV: 69 MM HG (ref 41–51)
PH BLDV: 7.12 PH (ref 7.33–7.43)
PO2 BLDV: 42 MM HG (ref 35–45)
POTASSIUM BLDV-SCNC: 4.4 MMOL/L (ref 3.5–5.3)
SAO2 % BLDV: 50 % (ref 45–75)
SODIUM BLDV-SCNC: 124 MMOL/L (ref 136–145)

## 2025-06-25 PROCEDURE — 71045 X-RAY EXAM CHEST 1 VIEW: CPT

## 2025-06-25 PROCEDURE — 84132 ASSAY OF SERUM POTASSIUM: CPT

## 2025-06-25 PROCEDURE — 74177 CT ABD & PELVIS W/CONTRAST: CPT

## 2025-06-25 PROCEDURE — 70486 CT MAXILLOFACIAL W/O DYE: CPT

## 2025-06-25 PROCEDURE — 76376 3D RENDER W/INTRP POSTPROCES: CPT

## 2025-06-25 PROCEDURE — 72125 CT NECK SPINE W/O DYE: CPT

## 2025-06-25 PROCEDURE — 93005 ELECTROCARDIOGRAM TRACING: CPT

## 2025-06-25 PROCEDURE — 70450 CT HEAD/BRAIN W/O DYE: CPT

## 2025-06-26 ENCOUNTER — APPOINTMENT (OUTPATIENT)
Dept: RADIOLOGY | Facility: HOSPITAL | Age: 65
DRG: 004 | End: 2025-06-26
Payer: MEDICARE

## 2025-06-26 ENCOUNTER — APPOINTMENT (OUTPATIENT)
Dept: NEUROLOGY | Facility: HOSPITAL | Age: 65
DRG: 004 | End: 2025-06-26
Payer: MEDICARE

## 2025-06-26 ENCOUNTER — APPOINTMENT (OUTPATIENT)
Dept: RADIOLOGY | Facility: HOSPITAL | Age: 65
End: 2025-06-26
Payer: MEDICARE

## 2025-06-26 PROCEDURE — 73070 X-RAY EXAM OF ELBOW: CPT | Mod: LT

## 2025-06-26 PROCEDURE — 73030 X-RAY EXAM OF SHOULDER: CPT | Mod: LT

## 2025-06-26 PROCEDURE — 73090 X-RAY EXAM OF FOREARM: CPT | Mod: LT

## 2025-06-26 PROCEDURE — 70496 CT ANGIOGRAPHY HEAD: CPT

## 2025-06-26 PROCEDURE — 73100 X-RAY EXAM OF WRIST: CPT | Mod: LT

## 2025-06-26 PROCEDURE — 71045 X-RAY EXAM CHEST 1 VIEW: CPT

## 2025-06-26 PROCEDURE — 70450 CT HEAD/BRAIN W/O DYE: CPT

## 2025-06-26 PROCEDURE — 74018 RADEX ABDOMEN 1 VIEW: CPT

## 2025-06-26 PROCEDURE — 73120 X-RAY EXAM OF HAND: CPT | Mod: LT

## 2025-06-26 PROCEDURE — 73060 X-RAY EXAM OF HUMERUS: CPT | Mod: LT

## 2025-06-27 ENCOUNTER — APPOINTMENT (OUTPATIENT)
Dept: RADIOLOGY | Facility: HOSPITAL | Age: 65
End: 2025-06-27
Payer: MEDICARE

## 2025-06-27 ENCOUNTER — APPOINTMENT (OUTPATIENT)
Dept: RADIOLOGY | Facility: HOSPITAL | Age: 65
DRG: 004 | End: 2025-06-27
Payer: MEDICARE

## 2025-06-27 PROCEDURE — 71045 X-RAY EXAM CHEST 1 VIEW: CPT

## 2025-06-27 PROCEDURE — 70450 CT HEAD/BRAIN W/O DYE: CPT

## 2025-06-28 ENCOUNTER — APPOINTMENT (OUTPATIENT)
Dept: RADIOLOGY | Facility: HOSPITAL | Age: 65
End: 2025-06-28
Payer: MEDICARE

## 2025-06-28 ENCOUNTER — APPOINTMENT (OUTPATIENT)
Dept: RADIOLOGY | Facility: HOSPITAL | Age: 65
DRG: 004 | End: 2025-06-28
Payer: MEDICARE

## 2025-06-28 PROCEDURE — 71045 X-RAY EXAM CHEST 1 VIEW: CPT

## 2025-06-29 ENCOUNTER — DOCUMENTATION (OUTPATIENT)
Dept: SURGICAL ICU | Facility: HOSPITAL | Age: 65
End: 2025-06-29
Payer: MEDICAID

## 2025-06-29 ENCOUNTER — APPOINTMENT (OUTPATIENT)
Dept: RADIOLOGY | Facility: HOSPITAL | Age: 65
DRG: 004 | End: 2025-06-29
Payer: MEDICARE

## 2025-06-29 ENCOUNTER — APPOINTMENT (OUTPATIENT)
Dept: RADIOLOGY | Facility: HOSPITAL | Age: 65
End: 2025-06-29
Payer: MEDICARE

## 2025-06-29 PROCEDURE — 70551 MRI BRAIN STEM W/O DYE: CPT

## 2025-06-29 PROCEDURE — 71045 X-RAY EXAM CHEST 1 VIEW: CPT

## 2025-06-29 PROCEDURE — 73610 X-RAY EXAM OF ANKLE: CPT | Mod: RT

## 2025-06-29 PROCEDURE — 73562 X-RAY EXAM OF KNEE 3: CPT | Mod: RT

## 2025-06-29 PROCEDURE — 73590 X-RAY EXAM OF LOWER LEG: CPT | Mod: RT

## 2025-06-29 PROCEDURE — 73610 X-RAY EXAM OF ANKLE: CPT | Mod: LT

## 2025-06-29 PROCEDURE — 73110 X-RAY EXAM OF WRIST: CPT | Mod: RT

## 2025-06-29 PROCEDURE — 73552 X-RAY EXAM OF FEMUR 2/>: CPT | Mod: RT

## 2025-06-29 PROCEDURE — 73562 X-RAY EXAM OF KNEE 3: CPT | Mod: LT

## 2025-06-29 PROCEDURE — 73060 X-RAY EXAM OF HUMERUS: CPT | Mod: RT

## 2025-06-29 PROCEDURE — 73090 X-RAY EXAM OF FOREARM: CPT | Mod: RT

## 2025-06-29 PROCEDURE — 73552 X-RAY EXAM OF FEMUR 2/>: CPT | Mod: LT

## 2025-06-29 PROCEDURE — 73130 X-RAY EXAM OF HAND: CPT | Mod: RT

## 2025-06-30 ENCOUNTER — APPOINTMENT (OUTPATIENT)
Dept: RADIOLOGY | Facility: HOSPITAL | Age: 65
DRG: 004 | End: 2025-06-30
Payer: MEDICARE

## 2025-06-30 ENCOUNTER — APPOINTMENT (OUTPATIENT)
Dept: RADIOLOGY | Facility: HOSPITAL | Age: 65
End: 2025-06-30
Payer: MEDICARE

## 2025-06-30 PROCEDURE — 71045 X-RAY EXAM CHEST 1 VIEW: CPT

## 2025-07-01 ENCOUNTER — APPOINTMENT (OUTPATIENT)
Dept: RADIOLOGY | Facility: HOSPITAL | Age: 65
DRG: 004 | End: 2025-07-01
Payer: MEDICARE

## 2025-07-01 PROCEDURE — 71045 X-RAY EXAM CHEST 1 VIEW: CPT

## 2025-07-02 ENCOUNTER — APPOINTMENT (OUTPATIENT)
Dept: RADIOLOGY | Facility: HOSPITAL | Age: 65
DRG: 004 | End: 2025-07-02
Payer: MEDICARE

## 2025-07-02 PROCEDURE — 71045 X-RAY EXAM CHEST 1 VIEW: CPT

## 2025-07-02 PROCEDURE — 74018 RADEX ABDOMEN 1 VIEW: CPT

## 2025-07-03 ENCOUNTER — APPOINTMENT (OUTPATIENT)
Dept: RADIOLOGY | Facility: HOSPITAL | Age: 65
DRG: 004 | End: 2025-07-03
Payer: MEDICARE

## 2025-07-03 PROCEDURE — 71045 X-RAY EXAM CHEST 1 VIEW: CPT

## 2025-07-04 ENCOUNTER — APPOINTMENT (OUTPATIENT)
Dept: RADIOLOGY | Facility: HOSPITAL | Age: 65
DRG: 004 | End: 2025-07-04
Payer: MEDICARE

## 2025-07-04 PROCEDURE — 71045 X-RAY EXAM CHEST 1 VIEW: CPT

## 2025-07-06 ENCOUNTER — APPOINTMENT (OUTPATIENT)
Dept: RADIOLOGY | Facility: HOSPITAL | Age: 65
DRG: 004 | End: 2025-07-06
Payer: MEDICARE

## 2025-07-06 PROCEDURE — 71045 X-RAY EXAM CHEST 1 VIEW: CPT

## 2025-07-07 ENCOUNTER — ANESTHESIA EVENT (OUTPATIENT)
Dept: OPERATING ROOM | Facility: HOSPITAL | Age: 65
DRG: 004 | End: 2025-07-07
Payer: MEDICARE

## 2025-07-07 ENCOUNTER — APPOINTMENT (OUTPATIENT)
Dept: CARDIOLOGY | Facility: HOSPITAL | Age: 65
DRG: 004 | End: 2025-07-07
Payer: MEDICARE

## 2025-07-07 PROBLEM — S06.5XAA SDH (SUBDURAL HEMATOMA) (MULTI): Status: ACTIVE | Noted: 2025-06-25

## 2025-07-07 PROBLEM — I61.5 INTRAVENTRICULAR HEMORRHAGE (MULTI): Status: ACTIVE | Noted: 2025-06-25

## 2025-07-07 PROCEDURE — 93005 ELECTROCARDIOGRAM TRACING: CPT

## 2025-07-07 NOTE — ANESTHESIA PREPROCEDURE EVALUATION
Patient: Rosa Thomas    Procedure Information       Date: 07/08/25    Procedures:       CREATION, TRACHEOSTOMY      EGD, WITH PEG TUBE INSERTION    Location: [facility] OR    Surgeons: Anila Fong MD          ALLERGIES:  Allergies[1]     MEDICAL HISTORY:  Medical History[2]     Relevant Problems   Neuro   (+) Intraventricular hemorrhage (Multi)      Nervous   (+) SDH (subdural hematoma) (Multi)        SURGICAL HISTORY:  Surgical History[3]     MEDICATIONS:  Current Outpatient Medications   Medication Instructions    albuterol 90 mcg/actuation inhaler 2 puffs, inhalation, Every 6 hours PRN    aspirin 81 mg, oral, Daily    atorvastatin (LIPITOR) 20 mg, oral, Daily    metoprolol succinate XL (TOPROL-XL) 25 mg, oral, Daily    metroNIDAZOLE (Metrocream) 0.75 % cream 1 Application, Topical, 2 times daily PRN    omeprazole (PRILOSEC) 20 mg, oral, 2 times daily        VITALS:      7/7/2025     1:00 PM 7/7/2025    12:29 PM 7/7/2025    12:00 PM   Vitals   Systolic 106  86   Diastolic 78  58   BP Location   Left arm   Heart Rate 89 83 76   Temp   37 °C (98.6 °F)   Resp 26 22 20       LABS:   BMP   Lab Results   Component Value Date    GLUCOSE 135 (H) 07/07/2025    CALCIUM 8.5 (L) 07/07/2025     07/07/2025    K 3.9 07/07/2025    CO2 29 07/07/2025     07/07/2025    BUN 18 07/07/2025    CREATININE 0.48 (L) 07/07/2025   , LFT   Lab Results   Component Value Date    ALT 36 06/26/2025    AST 39 06/26/2025    ALKPHOS 48 06/26/2025    BILITOT 0.4 06/26/2025   , CBC  Lab Results   Component Value Date    WBC 14.1 (H) 07/07/2025    HGB 9.1 (L) 07/07/2025    HCT 28.4 (L) 07/07/2025    MCV 90 07/07/2025     (H) 07/07/2025          , Coags   Lab Results   Component Value Date/Time    PROTIME 11.7 06/25/2025 1913    INR 1.1 06/25/2025 1913      , A1C   Lab Results   Component Value Date    HGBA1C 5.8 (H) 06/26/2025       IMAGES:  EKG          Encounter Date: 06/25/25   Electrocardiogram, 12-lead PRN ACS  symptoms   Result Value    Ventricular Rate 90    Atrial Rate 90    IA Interval 140    QRS Duration 80    QT Interval 324    QTC Calculation(Bazett) 396    P Axis 68    R Axis 75    T Axis 79    QRS Count 15    Q Onset 214    P Onset 144    P Offset 199    T Offset 376    QTC Fredericia 370    Narrative    Normal sinus rhythm  Nonspecific T wave abnormality  Abnormal ECG  When compared with ECG of 05-JUL-2025 11:17,  No significant change was found      , ECHO       No results found for this or any previous visit from the past 730 days.    , CARDIAC CATH      No results found for this or any previous visit from the past 730 days.   , CXR       XR chest 1 view 07/06/2025    Narrative  Interpreted By:  Nikolas Oliva,  STUDY:  XR CHEST 1 VIEW; 7/6/2025 7:21 am    INDICATION:  Signs/Symptoms:eval ett, for effusions/ consolidations.    COMPARISON:  10/04/2025.    ACCESSION NUMBER(S):  [id number]    ORDERING CLINICIAN:  AME AYOUB    FINDINGS:  Endotracheal tube in satisfactory position.  Redundant loop of esophageal temperature probe overlying the cervical  esophagus. Radiopaque density overlying the region of the esophageal  temperature probe. Correlate with radiopaque density external to the  patient or possible swallowed tooth or dental amalgam.    CARDIOMEDIASTINAL SILHOUETTE:  Cardiomediastinal silhouette is normal in size and configuration.    LUNGS:  There is a small right apical pneumothorax.  There are multifocal parenchymal airspace opacities with slight  improvement in right basilar aeration.    ABDOMEN:  NG tube overlies the body of stomach.    BONES:  No acute osseous changes.    Impression  1. Small right apical pneumothorax.  Slight interval improvement in multifocal airspace opacities and  correlate with residual edema/multifocal pneumonia/ARDS.  2. Redundant loop esophageal temperature probe with radiopaque  density may be external to the patient but correlate any concern for  ingested or  aspirated tooth/dental amalgam.      Signed by: Nikolas Oliva 7/6/2025 12:07 PM  Dictation workstation:   [id number]    , CT Head/Neck       CT head wo IV contrast 06/27/2025    Narrative  Interpreted By:  Leo Duncan,  STUDY:  CT HEAD WO IV CONTRAST;  6/27/2025 4:32 am    INDICATION:  Signs/Symptoms:stability scan.      COMPARISON:  Head CT date 06/26/2025    ACCESSION NUMBER(S):  [id number]    ORDERING CLINICIAN:  NICKY OTOOLE    TECHNIQUE:  Noncontrast axial CT scan of head was performed. Angled reformats in  brain and bone windows were generated. The images were reviewed in  bone, brain, blood and soft tissue windows.    FINDINGS:  CSF Spaces: There remains hyperdense intraventricular clot greater on  the right and unchanged over the interval. There remains a slight of  mount of hyperdensity maximally 3 mm in with along the  posterosuperior interhemispheric margin. The ventricles, sulci and  basal cisterns are within normal limits.    Parenchyma: There is persistent scant sulcal hyper density most  apparent above the left sylvian fissure. The punctate right frontal  hemorrhages are now well seen on the current study. The grey-white  differentiation is intact. There is no mass effect or midline shift.    Calvarium: The calvarium remains intact.    Paranasal sinuses and mastoids: Visualized paranasal sinuses and  mastoids are clear.    Impression  Stable exam.    Interpreted within [place], [place]    MACRO:  None    Signed by: Leo Duncan 6/27/2025 4:32 PM  Dictation workstation:   [id number]    , CT Chest        CT chest abdomen pelvis w IV contrast 06/25/2025  CT thoracic spine retrospective reconstruction protocol 06/25/2025  CT lumbar spine retrospective reconstruction protocol 06/25/2025    Narrative  Interpreted By:  Kenna Latham  and Azeb Calvo  STUDY:  CT CHEST ABDOMEN PELVIS W IV CONTRAST; CT THORACIC SPINE  RETROSPECTIVE RECONSTRUCTION  PROTOCOL; CT LUMBAR SPINE RETROSPECTIVE  RECONSTRUCTION PROTOCOL;  6/25/2025 8:09 pm    INDICATION:  Signs/Symptoms:Trauma; Signs/Symptoms:trauma.    COMPARISON:  None.    ACCESSION NUMBER(S):  [id number]; [id number]; [id number]    ORDERING CLINICIAN:  NICKY OTOOLE    TECHNIQUE:  Contiguous axial images of the chest, abdomen, and pelvis were  obtained after the intravenous administration of 100 mL Omnipaque 350  iodinated contrast followed my image acquisition in the ar. Coronal  and sagittal reformatted images were reconstructed from the axial  data.    Multiplanar reformatted images of the thoracic and lumbar spine were  reconstructed from source data of concurrent CT chest/abdomen/pelvis  acquisition.    FINDINGS:  Images are somewhat degraded by motion.    CT CHEST:    LUNG, AIRWAYS, PLEURA: Consolidative airspace opacity in the right  lung base is suspicious for developing pulmonary contusion versus  infiltrate. Small right-sided and trace left-sided pleural effusion  is present.    Trachea and central airways are patent.    There are mild paraseptal emphysematous changes in the lungs  bilaterally, without evidence of pneumothorax.    MEDIASTINUM AND LYMPH NODES:  The esophagus appears within normal  limits. No enlarged intrathoracic or axillary lymph nodes by imaging  criteria. No pneumomediastinum.    VESSELS: Normal caliber thoracic aorta. No evidence of traumatic  aortic injury. No significant aortic atherosclerosis.    HEART: The heart is nonenlarged. No significant coronary artery  calcifications. No significant pericardial effusion.    CHEST WALL SOFT TISSUES: No discernible acute abnormality. No  radiopaque foreign body or soft tissue gas. Few small hypodense  nodules of the left thyroid lobe.    OSSEOUS STRUCTURES: Comminuted and mildly displaced fractures of the  posterior right 10th, 11th, and 12th ribs (series 204 images 256,  290, 349). Non comminuted nondisplaced fracture of the  posterior  right 9th rib (image 221).      CT ABDOMEN/PELVIS:    LIVER: Scattered hypodense lesions of the liver, the largest in the  right hepatic lobe measuring up to 2.4 cm, likely cysts. There is an  additional cystic appearing lesion of the posterior right hepatic  lobe near the dome measuring roughly 1.5 cm with adjacent capsular  irregularity and possible small hypodense subcapsular collection,  which may reflect a ruptured hepatic cyst or some hemorrhagic  products from the adjacent adrenal pathology.    Liver is nonenlarged. No discrete hepatic laceration is identified.  There may be mild fatty infiltration..    BILE DUCTS: No significant intrahepatic or extrahepatic dilatation.    GALLBLADDER: No radiopaque cholelithiasis or acute cholecystitis.    PANCREAS: No significant abnormality.    SPLEEN: No significant abnormality.    ADRENALS: There is a right adrenal hematoma with focus of contrast  blush which enlarges on portal venous phase images, worrisome for  active extravasation (series 301, image 49). There is some adjacent  reactive fat stranding with small amount of hemorrhagic blood  products, which track inferiorly along the inferior vena cava.    The expanding adrenal hematoma appears to exert mild mass effect on  the adjacent inferior vena cava. The left adrenal gland is  unremarkable.    KIDNEYS, URETERS, BLADDER: High-density rounded focus near the medial  aspect of the left interpolar kidney near the hilum measuring up to  2.3 cm may reflect a a hemorrhagic/proteinaceous cyst although renal  injuries difficult to entirely exclude. No definitive evidence of  urine extravasation, however there is perinephric fluid and stranding  of intermediate to high density, which may reflect abrupt products  from renal injury versus adjacent adrenal pathology.    The left kidney demonstrates few cystic low to intermediate density  foci throughout its cortex with mild localized perinephric stranding  and fluid.  There is some nonspecific perinephric stranding present  along the left kidney with somewhat indistinct appearance of the left  renal pole, likely artifactual from motion, although subtle renal  injuries difficult to entirely exclude.    The urinary bladder is unremarkable.    REPRODUCTIVE ORGANS: The prostate is mildly enlarged measuring 4.6 cm  in transverse axis.    VESSELS: Mild stranding surrounds the left renal artery, although  there is no evidence of contrast extravasation/leakage.    LYMPH NODES/RETROPERITONEUM: Fluid and stranding in the right  retroperitoneum and small volume hemorrhage layering along the  posterior aspect of the right hemidiaphragm. No abdominopelvic  lymphadenopathy.    BOWEL/MESENTERY/PERITONEUM: Minimal nonspecific thickening in the  region of the distal stomach and proximal duodenum with blunt  duodenal injury not entirely excluded. Normal appendix. No abnormal  bowel dilatation is present. No inflammatory large bowel wall  thickening is identified. No pneumoperitoneum or abdominopelvic  collection. Trace fluid along the right posterior peritoneum  reflections.    ABDOMINAL WALL: Contusion of the left hip and buttock. No discrete  hematoma or skin surface abnormality. No radiopaque foreign body or  soft tissue gas.    MUSCULOSKELETAL: No acute fracture or malalignment within the  included osseous structures.      CT THORACIC SPINE:    PARASPINAL SOFT TISSUES: No paravertebral fluid collection or  significant edema. ALIGNMENT: No traumatic spondylolisthesis or  traumatic facet widening. VERTEBRAE: Mild cortical irregularity and  linear lucency through the left T12 lamina extending to the inferior  articular facet (series 205, image 716) without significant  displacement of fragments. No vertebral body height loss. SPINAL  CANAL/INTERVERTEBRAL DISCS: No high-grade spinal canal stenosis. No  significant disc height loss.    CT LUMBAR SPINE:    PARASPINAL SOFT TISSUES: No paravertebral  fluid collection or  significant edema. ALIGNMENT: No traumatic spondylolisthesis or  traumatic facet widening. VERTEBRAE: No acute fracture or trauma to  the posterior elements of the lumbar spine. Vertebral body heights  are maintained. SPINAL CANAL/INTERVERTEBRAL DISCS: No high-grade  spinal canal stenosis. Moderate to severe intervertebral disc height  loss is present at L5-S1. NEURAL FORAMINA: Mild neural foraminal  stenosis is present at L4-L5 and L5-S1 due to hypertrophic facet  changes.    Impression  CT CHEST/ABDOMEN/PELVIS:  1. Right adrenal hematoma with focus of contrast blush enlarging on  portal venous phase, somewhat concerning for ongoing active bleeding.  Small amount surrounding mid to high-density hemorrhagic products are  present in the right retroperitoneum and along the adjacent  inferomedial right hepatic lobe.  2. Irregular mid to high density foci involving the medial right  interpolar renal cortex and the left anterior superior renal cortex  with some adjacent mildly hyperdense material. Findings may represent  hemorrhagic/proteinaceous cysts, although due to some motion subtle  renal injuries are not entirely excluded.  3. Mild thickening of the left renal vein with some adjacent fat  stranding may be artifactual, although subtle vascular injuries  difficult to entirely exclude. There is no evidence of contrast  leakage suggest active hemorrhage from the vessels.  4. Mildly displaced and comminuted posterior rib fractures involving  the right 9th through 12th ribs.  5. Airspace opacity in the right lung base is suspicious for  developing pulmonary contusion versus atelectasis. There is a small  right-sided pleural effusion with trace fluid in the left lung base.  No evidence of sizable pneumothorax.  6. Some layering hyperdense material along the posteromedial right  hepatic lobe is likely from adjacent adrenal pathology, with rupture  of adjacent right posterior hepatic lobe cyst favored  less likely,  although not entirely excluded. No evidence of active extravasation.  7. Mild thickening of the distal stomach and proximal duodenum with  duodenal injury not entirely excluded given its proximity to other  injured structures.  8. Soft tissue contusion of the left hip without evidence of  underlying osseous abnormality.    CT THORACIC AND LUMBAR SPINE:  1. Cortical irregularity and linear lucency through the left T12  lamina extending to the inferior articular facet may represent a  nutrient channel versus a nondisplaced fracture. Otherwise, no  significant trauma is identified in the thoracic spine.  2. No evidence of lumbar spine fracture or malalignment.    I personally reviewed the image(s)/study and resident interpretation  as stated by Dr. Lubna Bowie MD. I agree with the findings as  stated. This study was interpreted at [facility], [place], [place].    MACRO:  Critical Finding:  There are multiple critical findings. See  findings. notification was initiated on 6/25/2025 at 9:13 pm by  Lubna Bowie.  (**-OCF-**)    Signed by: Kenna Latham 6/25/2025 9:34 PM  Dictation workstation:   [id number]   , CT Abdomin     No results found for this or any previous visit from the past 730 days.    SOCIAL:  Social History     Tobacco Use   Smoking Status Not on file   Smokeless Tobacco Not on file      Social History     Substance and Sexual Activity   Alcohol Use Not on file      Social History     Substance and Sexual Activity   Drug Use Not on file        NPO STATUS:  No data recorded    Clinical Areas Reviewed:    Allergies  Meds               Anesthesia Assessment:    Physical Exam    Airway  Mallampati: unable to assess     Cardiovascular   Rhythm: regular  Rate: normal     Dental    Pulmonary (+) decreased breath sounds     Abdominal (+) obese  Abdomen: soft             Anesthesia Plan    History of general anesthesia?: yes  History of  complications of general anesthesia?: unknown/emergency    ASA 4     general     Use of blood products discussed with who consented to blood products.    Plan discussed with [facility].               [1] No Known Allergies  [2] No past medical history on file.  [3] No past surgical history on file.

## 2025-07-08 ENCOUNTER — ANESTHESIA (OUTPATIENT)
Dept: OPERATING ROOM | Facility: HOSPITAL | Age: 65
DRG: 004 | End: 2025-07-08
Payer: MEDICARE

## 2025-07-08 ENCOUNTER — APPOINTMENT (OUTPATIENT)
Dept: RADIOLOGY | Facility: HOSPITAL | Age: 65
DRG: 004 | End: 2025-07-08
Payer: MEDICARE

## 2025-07-08 ENCOUNTER — SURGERY (OUTPATIENT)
Age: 65
End: 2025-07-08
Payer: MEDICAID

## 2025-07-08 PROCEDURE — A31600 PR TRACHEOSTOMY, PLANNED: Performed by: STUDENT IN AN ORGANIZED HEALTH CARE EDUCATION/TRAINING PROGRAM

## 2025-07-08 PROCEDURE — 71045 X-RAY EXAM CHEST 1 VIEW: CPT

## 2025-07-08 PROCEDURE — A31600 PR TRACHEOSTOMY, PLANNED: Performed by: ANESTHESIOLOGIST ASSISTANT

## 2025-07-08 PROCEDURE — 2500000004 HC RX 250 GENERAL PHARMACY W/ HCPCS (ALT 636 FOR OP/ED): Performed by: ANESTHESIOLOGIST ASSISTANT

## 2025-07-08 RX ORDER — PHENYLEPHRINE HCL IN 0.9% NACL 0.4MG/10ML
SYRINGE (ML) INTRAVENOUS AS NEEDED
Status: DISCONTINUED | OUTPATIENT
Start: 2025-07-08 | End: 2025-07-08

## 2025-07-08 RX ORDER — ROCURONIUM BROMIDE 10 MG/ML
INJECTION, SOLUTION INTRAVENOUS AS NEEDED
Status: DISCONTINUED | OUTPATIENT
Start: 2025-07-08 | End: 2025-07-08

## 2025-07-08 RX ORDER — SODIUM CHLORIDE, SODIUM LACTATE, POTASSIUM CHLORIDE, CALCIUM CHLORIDE 600; 310; 30; 20 MG/100ML; MG/100ML; MG/100ML; MG/100ML
INJECTION, SOLUTION INTRAVENOUS CONTINUOUS PRN
Status: DISCONTINUED | OUTPATIENT
Start: 2025-07-08 | End: 2025-07-08

## 2025-07-08 RX ADMIN — WATER 1000 ML: 100 IRRIGANT IRRIGATION at 11:15

## 2025-07-08 RX ADMIN — SODIUM CHLORIDE, POTASSIUM CHLORIDE, SODIUM LACTATE AND CALCIUM CHLORIDE: 600; 310; 30; 20 INJECTION, SOLUTION INTRAVENOUS at 11:05

## 2025-07-08 RX ADMIN — ROCURONIUM BROMIDE 40 MG: 10 INJECTION INTRAVENOUS at 11:05

## 2025-07-08 RX ADMIN — Medication 120 MCG: at 11:31

## 2025-07-09 PROBLEM — S22.41XA MULTIPLE CLOSED FRACTURES OF RIBS OF RIGHT SIDE: Status: ACTIVE | Noted: 2025-07-09

## 2025-07-09 PROBLEM — J96.01 ACUTE RESPIRATORY FAILURE WITH HYPOXIA: Status: ACTIVE | Noted: 2025-07-09
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
## (undated) DEVICE — TR BAND, RADIAL COMPRESSION, STANDARD, 24CM

## (undated) DEVICE — GUIDE WIRE, 260CM, HI-TORQUE, VERSACORE, MODIFIED J

## (undated) DEVICE — GUIDEWIRE, INQUIRE, J TIP, .035 X 210CM, FIXED CORE, DIAGNOSTIC

## (undated) DEVICE — CATHETER, OPTITORQUE, 6FR, JACKY, BL3.5/2H/100CM

## (undated) DEVICE — SHEATH, GLIDESHEATH, SLENDER, 6FR 10CM